# Patient Record
Sex: FEMALE | Race: WHITE
[De-identification: names, ages, dates, MRNs, and addresses within clinical notes are randomized per-mention and may not be internally consistent; named-entity substitution may affect disease eponyms.]

---

## 2017-04-27 ENCOUNTER — HOSPITAL ENCOUNTER (EMERGENCY)
Dept: HOSPITAL 10 - E/R | Age: 57
Discharge: HOME | End: 2017-04-27
Payer: MEDICAID

## 2017-04-27 VITALS
DIASTOLIC BLOOD PRESSURE: 87 MMHG | SYSTOLIC BLOOD PRESSURE: 136 MMHG | RESPIRATION RATE: 18 BRPM | HEART RATE: 78 BPM | TEMPERATURE: 98.1 F

## 2017-04-27 VITALS
HEIGHT: 61 IN | BODY MASS INDEX: 24.56 KG/M2 | WEIGHT: 130.07 LBS | BODY MASS INDEX: 24.56 KG/M2 | WEIGHT: 130.07 LBS | HEIGHT: 61 IN

## 2017-04-27 DIAGNOSIS — R94.6: ICD-10-CM

## 2017-04-27 DIAGNOSIS — R07.9: ICD-10-CM

## 2017-04-27 DIAGNOSIS — Z79.4: ICD-10-CM

## 2017-04-27 DIAGNOSIS — E11.9: Primary | ICD-10-CM

## 2017-04-27 DIAGNOSIS — I10: ICD-10-CM

## 2017-04-27 LAB
ADD SCAN DIFF: NO
ADD UMIC: NO
ANION GAP SERPL CALC-SCNC: 17 MMOL/L (ref 8–16)
APTT BLD: 24.1 SEC (ref 25–35)
BASOPHILS # BLD AUTO: 0 10^3/UL (ref 0–0.1)
BASOPHILS NFR BLD: 0.1 % (ref 0–2)
BUN SERPL-MCNC: 14 MG/DL (ref 7–20)
CALCIUM SERPL-MCNC: 9.2 MG/DL (ref 8.4–10.2)
CHLORIDE SERPL-SCNC: 98 MMOL/L (ref 97–110)
CK MB SERPL-MCNC: 0.71 NG/ML (ref 0–2.4)
CK SERPL-CCNC: 71 IU/L (ref 23–200)
CO2 SERPL-SCNC: 25 MMOL/L (ref 21–31)
COLOR UR: (no result)
CREAT SERPL-MCNC: 0.48 MG/DL (ref 0.44–1)
EOSINOPHIL # BLD: 0.2 10^3/UL (ref 0–0.5)
EOSINOPHIL NFR BLD: 2.1 % (ref 0–7)
ERYTHROCYTE [DISTWIDTH] IN BLOOD BY AUTOMATED COUNT: 13.3 % (ref 11.5–14.5)
GLUCOSE SERPL-MCNC: 477 MG/DL (ref 70–220)
GLUCOSE UR STRIP-MCNC: >=1000 %
HCT VFR BLD CALC: 41.4 % (ref 37–47)
HGB BLD-MCNC: 13.7 G/DL (ref 12–16)
INR PPP: 0.86
KETONES UR STRIP.AUTO-MCNC: NEGATIVE MG/DL
LYMPHOCYTES # BLD AUTO: 2.2 10^3/UL (ref 0.8–2.9)
LYMPHOCYTES NFR BLD AUTO: 30.1 % (ref 15–51)
MCH RBC QN AUTO: 26.4 PG (ref 29–33)
MCHC RBC AUTO-ENTMCNC: 33.1 G/DL (ref 32–37)
MCV RBC AUTO: 79.8 FL (ref 82–101)
MONOCYTES # BLD: 0.4 10^3/UL (ref 0.3–0.9)
MONOCYTES NFR BLD: 5.7 % (ref 0–11)
NEUTROPHILS # BLD: 4.4 10^3/UL (ref 1.6–7.5)
NEUTROPHILS NFR BLD AUTO: 61.6 % (ref 39–77)
NITRITE UR QL STRIP.AUTO: NEGATIVE
NRBC # BLD MANUAL: 0 10^3/UL (ref 0–0)
NRBC BLD QL: 0 /100WBC (ref 0–0)
PLATELET # BLD: 222 10^3/UL (ref 140–415)
PMV BLD AUTO: 10.3 FL (ref 7.4–10.4)
POTASSIUM SERPL-SCNC: 4.3 MMOL/L (ref 3.5–5.1)
PROTHROMBIN TIME: 11.7 SEC (ref 12.2–14.2)
PT RATIO: 0.9
RBC # BLD AUTO: 5.19 10^6/UL (ref 4.2–5.4)
RBC # UR AUTO: NEGATIVE /UL
SODIUM SERPL-SCNC: 136 MMOL/L (ref 135–144)
TROPONIN I SERPL-MCNC: < 0.012 NG/ML (ref 0–0.12)
TROPONIN I SERPL-MCNC: < 0.012 NG/ML (ref 0–0.12)
TSH SERPL-ACNC: 0.35 MIU/L (ref 0.47–4.68)
URINE BILIRUBIN (DIP): NEGATIVE
URINE TOTAL PROTEIN (DIP): NEGATIVE
UROBILINOGEN UR STRIP-ACNC: (no result) (ref 0.1–1)
WBC # BLD AUTO: 7.2 10^3/UL (ref 4.8–10.8)
WBC # UR STRIP: NEGATIVE /UL

## 2017-04-27 PROCEDURE — 84484 ASSAY OF TROPONIN QUANT: CPT

## 2017-04-27 PROCEDURE — 81003 URINALYSIS AUTO W/O SCOPE: CPT

## 2017-04-27 PROCEDURE — 85025 COMPLETE CBC W/AUTO DIFF WBC: CPT

## 2017-04-27 PROCEDURE — 71010: CPT

## 2017-04-27 PROCEDURE — 82550 ASSAY OF CK (CPK): CPT

## 2017-04-27 PROCEDURE — 84443 ASSAY THYROID STIM HORMONE: CPT

## 2017-04-27 PROCEDURE — 82553 CREATINE MB FRACTION: CPT

## 2017-04-27 PROCEDURE — 85730 THROMBOPLASTIN TIME PARTIAL: CPT

## 2017-04-27 PROCEDURE — 96372 THER/PROPH/DIAG INJ SC/IM: CPT

## 2017-04-27 PROCEDURE — 36415 COLL VENOUS BLD VENIPUNCTURE: CPT

## 2017-04-27 PROCEDURE — 80048 BASIC METABOLIC PNL TOTAL CA: CPT

## 2017-04-27 PROCEDURE — 93005 ELECTROCARDIOGRAM TRACING: CPT

## 2017-04-27 PROCEDURE — 82962 GLUCOSE BLOOD TEST: CPT

## 2017-04-27 PROCEDURE — 85610 PROTHROMBIN TIME: CPT

## 2017-04-27 NOTE — ERD
ER Documentation


Chief Complaint


Date/Time


DATE: 4/27/17 


TIME: 14:10


Chief Complaint


CAME IN VIA INTAKE DUE GENERALIZED SHAKING, VISION LOSS, AND SWEATING





HPI


This is a 36-year-old female who presents to the emergency room for evaluation 

of shaking, and palpitations for 1 hour.  The patient states that she woke up 

this morning, and drank tea with cinnamon.  She states that 30 minutes of his 

drinking this T she began to become shaky and stated her heart was beating 

fast.  The patient came to the ER for evaluation of her symptoms.  She is 

denying any active chest pain or palpitations or shortness of breath currently.

  She states that she is a diabetic and does take subcutaneous insulin at 

night.  Patient denies any fevers associated with this, and denies any nausea 

or vomiting.





ROS


All systems reviewed and are negative except as per history of present illness.





Medications


Home Meds


Reported Medications


Insulin Aspart (Novolog Mix (70/30)) 100 Units/Ml Soln, 44 UNITS SC DAILY, EA


   4/27/17





Allergies


Allergies:  


Coded Allergies:  


     acetaminophen (Verified  Allergy, Mild, 4/27/17)


     hydrocodone (Verified  Allergy, Mild, 4/27/17)


     sulfamethoxazole (Verified  Allergy, Mild, 4/27/17)


     trimethoprim (Verified  Allergy, Mild, 4/27/17)





PMhx/Soc


History of Surgery:  Yes (C SECTION,LOW BACK SX)


Anesthesia Reaction:  No


Hx Neurological Disorder:  No


Hx Respiratory Disorders:  No


Hx Cardiac Disorders:  Yes (HTN)


Hx Psychiatric Problems:  No


Hx Miscellaneous Medical Probl:  Yes (DM)


Hx Alcohol Use:  No


Hx Substance Use:  No


Hx Tobacco Use:  No


Smoking Status:  Never smoker





Physical Exam


Vitals





Vital Signs








  Date Time  Temp Pulse Resp B/P Pulse Ox O2 Delivery O2 Flow Rate FiO2


 


4/27/17 10:40 98.2 86 18 121/69 98   








Physical Exam


Const: No acute distress


Head:   Atraumatic 


Eyes:    Normal Conjunctiva


ENT:    Normal External Ears, Nose and Mouth.


Neck:               Full range of motion..~ No meningismus.


Resp:    Clear to auscultation bilaterally


Cardio:    Regular rate and rhythm, no murmurs


Abd:    Soft, non tender, non distended. Normal bowel sounds


Skin:    No petechiae or rashes


Back:    No midline or flank tenderness


Ext:    No cyanosis, or edema


Neur:    Awake and alert


Psych:    Normal Mood and Affect


Result Diagram:  


4/27/17 1210                                                                   

             4/27/17 1210





Results 24 hrs





 Laboratory Tests








Test


  4/27/17


12:10 4/27/17


13:02 4/27/17


13:50 4/27/17


14:06


 


White Blood Count 7.210^3/ul    


 


Red Blood Count 5.1910^6/ul    


 


Hemoglobin 13.7g/dl    


 


Hematocrit 41.4%    


 


Mean Corpuscular Volume 79.8fl    


 


Mean Corpuscular Hemoglobin 26.4pg    


 


Mean Corpuscular Hemoglobin


Concent 33.1g/dl 


  


  


  


 


 


Red Cell Distribution Width 13.3%    


 


Platelet Count 74620^3/UL    


 


Mean Platelet Volume 10.3fl    


 


Neutrophils % 61.6%    


 


Lymphocytes % 30.1%    


 


Monocytes % 5.7%    


 


Eosinophils % 2.1%    


 


Basophils % 0.1%    


 


Nucleated Red Blood Cells % 0.0/100WBC    


 


Neutrophils # 4.410^3/ul    


 


Lymphocytes # 2.210^3/ul    


 


Monocytes # 0.410^3/ul    


 


Eosinophils # 0.210^3/ul    


 


Basophils # 0.010^3/ul    


 


Nucleated Red Blood Cells # 0.010^3/ul    


 


Prothrombin Time 11.7Sec    


 


Prothrombin Time Ratio 0.9    


 


INR International Normalized


Ratio 0.86 


  


  


  


 


 


Activated Partial


Thromboplast Time 24.1Sec 


  


  


  


 


 


Sodium Level 136mmol/L    


 


Potassium Level 4.3mmol/L    


 


Chloride Level 98mmol/L    


 


Carbon Dioxide Level 25mmol/L    


 


Anion Gap 17    


 


Blood Urea Nitrogen 14mg/dl    


 


Creatinine 0.48mg/dl    


 


Glucose Level 477mg/dl    


 


Calcium Level 9.2mg/dl    


 


Creatine Kinase 71IU/L    


 


Creatine Kinase Index 1.0    


 


Creatinine Kinase MB (Mass) 0.71ng/ml    


 


Troponin I < 0.012ng/ml    


 


Thyroid Stimulating Hormone


(TSH) 0.348MIU/L 


  


  


  


 


 


Bedside Glucose  395mg/dL   275mg/dL 


 


Urine Color   LT. YELLOW  


 


Urine Clarity   CLEAR  


 


Urine pH   6.0  


 


Urine Specific Gravity   <=1.005  


 


Urine Ketones   NEGATIVE  


 


Urine Nitrite   NEGATIVE  


 


Urine Bilirubin   NEGATIVE  


 


Urine Urobilinogen   0.2  E.U./dL  


 


Urine Leukocyte Esterase   NEGATIVE  


 


Urine Hemoglobin   NEGATIVE  


 


Urine Glucose   >=1000%  


 


Urine Total Protein   NEGATIVE  








 Current Medications








 Medications


  (Trade)  Dose


 Ordered  Sig/Cheo


 Route


 PRN Reason  Start Time


 Stop Time Status Last Admin


Dose Admin


 


 Sodium Chloride


  (NS)  1,000 ml @ 


 1,000 mls/hr  Q1H STAT


 IV


   4/27/17 11:32


 4/27/17 12:31 DC 4/27/17 12:06


 


 


 Insulin Human


 Lispro


  (Humalog)  15 unit  ONCE  STAT


 SC


   4/27/17 12:49


 4/27/17 12:54 DC 4/27/17 13:02


 











Procedures/MDM


EKG: 


Rate/Rhythm:             [Normal Sinus Rhythm]


QRS, ST, T-waves:    [No changes consistent w/ acute ischemia]


Impression:      [No evidence of ischemia or arrhythmia]


Chest X-ray 1V Interpreted by me:


Soft Tissue:                                               No acute 

abnormalities


Bones:                                                    No acute abnormalities


Mediastinum/Cardiac Silhouette/Lungs:     [No acute abnormalities]





This 46-year-old female presents to the emergency room for evaluation of heart 

palpitations, and feeling shaky after drinking tea.  When I evaluated this 

patient she was not tachycardic, she was not tachypneic.  She did have cardiac 

workup in the ER.  First troponin was negative.  Chest x-ray is clear, and EKG 

is nonischemic.  This patient did have a elevation in her blood sugar.  She 

states she is not taking any medications this morning.  The patient was given 

15 units of subcutaneous lispro.  She has no signs of DKA.  This patient's TSH 

is low, and I feel this patient could have a component of hyperthyroidism.  The 

patient will be discharged home at this time with endocrinology follow-up.





Departure


Diagnosis:  


 Primary Impression:  


 Uncontrolled type II diabetes mellitus


 Additional Impression:  


 Low TSH level


Condition:  Stable











VALECHEIKH HAMILTON Apr 27, 2017 14:13

## 2017-04-27 NOTE — RADRPT
PROCEDURE:   Chest x-ray 

 

CLINICAL INDICATION:   Chest pain

 

TECHNIQUE:   Chest single view

 

COMPARISON:   None 

 

FINDINGS:

The heart is normal in size.  The pulmonary vessels are normal in caliber.  The lungs are clear.  Th
e costophrenic angles are sharp.  The visualized bony thorax is unremarkable. 

 

IMPRESSION:

 

No acute cardiopulmonary disease. 

 

 

RPTAT: HH

_____________________________________________ 

.Cody Waldron MD, MD           Date    Time 

Electronically viewed and signed by .Cody Waldron MD, MD on 04/27/2017 12:00 

 

D:  04/27/2017 12:00  T:  04/27/2017 12:00

.W/

## 2018-08-03 ENCOUNTER — HOSPITAL ENCOUNTER (EMERGENCY)
Dept: HOSPITAL 91 - FTE | Age: 58
Discharge: HOME | End: 2018-08-03
Payer: MEDICAID

## 2018-08-03 ENCOUNTER — HOSPITAL ENCOUNTER (EMERGENCY)
Age: 58
Discharge: HOME | End: 2018-08-03

## 2018-08-03 DIAGNOSIS — S80.212A: ICD-10-CM

## 2018-08-03 DIAGNOSIS — E11.9: ICD-10-CM
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
## 2018-08-03 DIAGNOSIS — I10: ICD-10-CM
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
## 2018-08-03 DIAGNOSIS — Z79.4: ICD-10-CM

## 2018-08-03 DIAGNOSIS — S80.211A: ICD-10-CM

## 2018-08-03 DIAGNOSIS — S82.831A: Primary | ICD-10-CM

## 2018-08-03 PROCEDURE — 73562 X-RAY EXAM OF KNEE 3: CPT

## 2018-08-03 PROCEDURE — 29505 APPLICATION LONG LEG SPLINT: CPT

## 2018-08-03 PROCEDURE — 99284 EMERGENCY DEPT VISIT MOD MDM: CPT

## 2018-08-03 RX ADMIN — IBUPROFEN 1 MG: 800 TABLET, FILM COATED ORAL at 11:54
